# Patient Record
Sex: FEMALE | Race: WHITE | NOT HISPANIC OR LATINO | Employment: UNEMPLOYED | URBAN - METROPOLITAN AREA
[De-identification: names, ages, dates, MRNs, and addresses within clinical notes are randomized per-mention and may not be internally consistent; named-entity substitution may affect disease eponyms.]

---

## 2021-07-14 ENCOUNTER — HISTORICAL (OUTPATIENT)
Dept: ADMINISTRATIVE | Facility: HOSPITAL | Age: 56
End: 2021-07-14

## 2021-09-27 PROBLEM — M22.41 CHONDROMALACIA, PATELLA, RIGHT: Status: ACTIVE | Noted: 2021-09-27

## 2022-04-10 ENCOUNTER — HISTORICAL (OUTPATIENT)
Dept: ADMINISTRATIVE | Facility: HOSPITAL | Age: 57
End: 2022-04-10

## 2022-04-14 PROBLEM — M17.0 PRIMARY OSTEOARTHRITIS OF BOTH KNEES: Status: ACTIVE | Noted: 2022-04-14

## 2022-04-26 VITALS
BODY MASS INDEX: 41.85 KG/M2 | HEIGHT: 64 IN | WEIGHT: 245.13 LBS | SYSTOLIC BLOOD PRESSURE: 135 MMHG | DIASTOLIC BLOOD PRESSURE: 89 MMHG

## 2022-05-02 NOTE — HISTORICAL [facility]
This is a historical note converted from Ilia. Formatting and pictures may have been removed.  Please reference Ilia for original formatting and attached multimedia. Chief Complaint  referral right knee pain  History of Present Illness  56 Years??Female?non-smoker?p[facility]?for?initial visit?for?right?knee pain.? Patient points to knee diffusely.  ?   Patient reports 4 weeks ago while?cleaning van experienced sudden onset?pain that brought her to knees. Lasted a while and was unable to bear weight. Went to ED in [place] was given meds (Chart check?Ultram 50 mg, Robaxin 750 mg and nabumetone 750 mg), brace and crutches. Describes her pain?as constant 9/10?throbbing;? She is taking Tramadol 40mg 2-3x/day and muscle relaxer TID. Associated symptoms + locking, + numbness and tingling, +erythematous. Denies falls, recent trauma. Ambulates with crutches to keep her balance and has been wearing a knee brace. Unable to do house chores and mow her lawn. PCP told her she has a cyst behind leg and referred her here.  ?  Onset: ?4 weeks ago  Current pain level: 9/10 (rated as?severe) ?without medication?. Quality described as throbbing  Modifying Factors: ?worse with/after activity; improved with rest;??no stiffness??  Previous treatment: none  Medications related to CC: Tramadol 40mg 2-3x/day, Tylenol PM twice for sleep, Muscle relaxer  Previous injuries:?remote history of trauma  Associated Symptoms:?crepitus/grinding; ?no numbness or tingling;??no swelling;?no skin changes;?no weakness;?no decrease in ROM  Activity:?sedentary; full ADLs;?pain interferes with function/daily activity (mild)  Family History:?family history of arthritis  PCP: Dr. Isma Arenas  Employment: Disabled due to education limitations  ?  Review of Systems  Constitutional: no fever, no chills, no weight loss  CV: no swelling, no edema  GI: no urinary retention, no urinary incontinence  : no fecal incontinence  Skin: no rash,  no wound  Neuro:?+ numbness/tingling, no weakness, no saddle anesthesia  MSK: as above  Psych: no depression, no anxiety  Heme/Lymph: no easy bruising, no easy bleeding, no lymphadenopathy  Immuno: no allergic reaction, no recurrent infections  Physical Exam  Vitals & Measurements  HR:?89(Peripheral)? RR:?16? BP:?135/89?  HT:?163.00?cm? WT:?111.200?kg? BMI:?41.85?  General: well developed; well nourished; cooperative  PSYCH: alert and oriented with?appropriate mood and affect  SKIN: inspection and palpation of skin and soft tissue normal; no scars noted on lower extremities  CV: vascular integrity noted; +2 symmetrical pulses, no anish  NEURO: sensation intact by light touch  LYMPH: no LAD noted  ?   MSK:?right knee  Inspection:?limping;??non weight?bearing, ambulating?with crutches;??normal?alignment;??no swelling;?no?erythema;?no?bruising;?atrophy/deconditioning of quadriceps  Palpation:?tenderness over the quadriceps muscle and tendon, patella, and at the medial and lateral joint line; ?Crepitus:??Negative  ROM:?  Active Extension/Flexion (0-140):?Unable to flex 2/2 pain  Passive?Extension/Flexion (0-140):?0  Strength:? Unable to assess 2/2 pain  Special Tests:  Ballotable Effusion: ?Negative; Fluid Wave:??Negative  Anterior Drawer:?not tested;? Lachman:?Negative;? Pivot Shift:?not tested  Posterior Sag Sign:??not tested;? Posterior Drawer:?not tested; Dial Test:??not tested  Varus Stress:?LCL stable at 0 and 30 degrees  Valgus Stress:?MCL?stable at 0 and 30 degrees  Patellar Grind: ?not tested  Yadira:?not tested?; Apleys Compression:?not tested; Thessaly: ?not tested  Noble Compression:?not tested; Daisy:?not tested  Neurovascular:?Intact; 2+?distal pulse, sensation intact to light touch  ?  MSK:?left knee  Inspection:?limping;??Non-?weight bearing?ambulating with?crutches?normal?alignment;??no swelling;?no?erythema;?no?bruising;?no atrophy or deconditioning noted  Palpation:??non-tender;  ?Crepitus:??Negative  ROM:?  Active Extension/Flexion (0-140):?0-140  Passive?Extension/Flexion (0-140):?0-130  Strength:? Flexion??5/5, Extension??5/5  Special Tests:  Ballotable Effusion: ?Negative; Fluid Wave:??Negative  Anterior Drawer:?Negative;? Lachman:?Negative;? Pivot Shift:?Negative  Posterior Sag Sign:??not tested;? Posterior Drawer:?Negative; Dial Test:??not tested  Varus Stress:?LCL stable at 0 and 30 degrees  Valgus Stress:?MCL?stable at 0 and 30 degrees  Patellar Grind: ?not tested  Yadira:?Negative?; Apleys Compression:?Negative; Thessaly: ?Negative  Noble Compression:?not tested; Daisy:?not tested  Neurovascular:?Intact; 2+?distal pulse, sensation intact to light touch  ?  ?  Assessment/Plan  1.?Osteoarthritis of right knee?M17.11  DX: ?right?Knee osteoarthritis, Medial Meniscus tear  Discussed with patient diagnosis and treatment recommendations.? Handout given.  Imaging:?radiological studies ordered and independently reviewed; discussed with patient; pending radiologist interpretation  Treatment plan:?conservative treatment to include Naproxen 500 BID, Rx for hinged knee brace given use for 1-2weeks. Formal PT ordered 3x/week for 12weeks. ?Recommend stationary cycling and HEP?to strengthen muscles and prevent deconditioning; hot or cold therapy; adequate vitamin C/D intake  Weight Management is paramount:?recommend at least 10 pounds weight loss  Procedure:?discussed CSI vs VS injections as treatment options;?if conservative measures did not improve  Activity:?Activity as tolerated; HEP to included aerobic conditioning with non-painful activity and ROM/STG exercises; proper footwear; assistive device to avoid limping  Therapy:?formal PT ordered  Medication:?first line treatment with daily Naproxen 500mg BID, Rx for Right Hinged Knee Brace given  RTC:?4-6 weeks, if conservative measures of NSAIDs and PT dont improve symptoms, consider CSI  Additional work-up:?none  2.?Other meniscus derangements,  anterior horn of medial meniscus, right knee?M23.311  3.?Acute lateral meniscus tear of right knee?S83.281A  4.?Patellofemoral disorders, right knee?M22.2X1  Orders:  naproxen, 500 mg = 1 tab(s), Oral, BID, X 30 day(s), # 60 tab(s), 1 Refill(s)  Discussed with the resident at time of visit? Patient chart reviewed and above documentation (HPI, ROS, PE,?& Plan)?created by?Fellow?with my edits following a case discussion. Patient seen and evaluated at time of visit.?HPI, PE, and Assessment and Plan reviewed. Treatment plan is reasonable and appropriate.? All questions were answered.??Compliance with treatment plan is appropriate.  ?Radiology images independently reviewed and agree with radiologist. ?Radiology images independently reviewed and agree with resident.?-Soumya Quesada, DO CAQSM   Medications  Antivert 50 mg oral tablet, 1 tab(s), Oral, BID, PRN,? ?Not taking  lisinopril 40 mg oral tablet, 40 mg= 1 tab(s), Oral, Daily  nabumetone 750 mg oral tablet, 750 mg= 1 tab(s), Oral, BID,? ?Not taking  naproxen 500 mg oral delayed release tablet, 500 mg= 1 tab(s), Oral, BID, 1 refills  Pantoprazole 40 mg ORAL EC-Tablet, 40 mg= 1 tab(s), Oral, Daily  proparacaine 0.5% ophthalmic solution, 1 drop(s), Eye-Right, Once  traMADol 50 mg oral tablet, 50 mg= 1 tab(s), Oral, qPM  Diagnostic Results  Right Knee XR 7/14/2021: Reviewed by me pending radiologist interpretation:  ?   Medial narrowing of the joint space. No fractures or dislocations. No Lateral femoral notching  ?   (06/15/2021 14:16 CDT MRI Ext Lower Joint Right W/O Contrast)  IMPRESSION:?  1. Chondromalacia patella.  2. Moderate size joint effusion. Mild synovitis.?  3. Large medial popliteal fossa cyst.  4. Mild medial compartment, moderate lateral compartment, and  patellofemoral degenerative changes.  5. Medial meniscus intrasubstance degeneration.  6. Lateral meniscus intrasubstance degeneration with a lateral  meniscus anterior horn superior and inferior articular  surface tear.  ? [1]     [1]?MRI Ext Lower Joint Right W/O Contrast; Reema WEI, Leticia ARAGON 06/15/2021 14:16 CDT

## 2023-02-27 ENCOUNTER — HOSPITAL ENCOUNTER (EMERGENCY)
Facility: HOSPITAL | Age: 58
Discharge: HOME | End: 2023-02-28
Attending: STUDENT IN AN ORGANIZED HEALTH CARE EDUCATION/TRAINING PROGRAM
Payer: MEDICAID

## 2023-02-27 DIAGNOSIS — M79.673: ICD-10-CM

## 2023-02-27 DIAGNOSIS — M79.672 LEFT FOOT PAIN: Primary | ICD-10-CM

## 2023-02-27 LAB
ALBUMIN SERPL-MCNC: 3.6 G/DL (ref 3.5–5)
ALBUMIN/GLOB SERPL: 1.1 RATIO (ref 1.1–2)
ALP SERPL-CCNC: 86 UNIT/L (ref 40–150)
ALT SERPL-CCNC: 12 UNIT/L (ref 0–55)
AST SERPL-CCNC: 16 UNIT/L (ref 5–34)
BASOPHILS # BLD AUTO: 0.03 X10(3)/MCL (ref 0–0.2)
BASOPHILS NFR BLD AUTO: 0.3 %
BILIRUBIN DIRECT+TOT PNL SERPL-MCNC: 0.3 MG/DL
BUN SERPL-MCNC: 11.9 MG/DL (ref 9.8–20.1)
CALCIUM SERPL-MCNC: 8.9 MG/DL (ref 8.4–10.2)
CHLORIDE SERPL-SCNC: 102 MMOL/L (ref 98–107)
CO2 SERPL-SCNC: 27 MMOL/L (ref 22–29)
CREAT SERPL-MCNC: 0.84 MG/DL (ref 0.55–1.02)
EOSINOPHIL # BLD AUTO: 0.19 X10(3)/MCL (ref 0–0.9)
EOSINOPHIL NFR BLD AUTO: 1.9 %
ERYTHROCYTE [DISTWIDTH] IN BLOOD BY AUTOMATED COUNT: 13.6 % (ref 11.5–17)
GFR SERPLBLD CREATININE-BSD FMLA CKD-EPI: >60 MLS/MIN/1.73/M2
GLOBULIN SER-MCNC: 3.2 GM/DL (ref 2.4–3.5)
GLUCOSE SERPL-MCNC: 110 MG/DL (ref 74–100)
HCT VFR BLD AUTO: 36.9 % (ref 37–47)
HGB BLD-MCNC: 12 G/DL (ref 12–16)
IMM GRANULOCYTES # BLD AUTO: 0.03 X10(3)/MCL (ref 0–0.04)
IMM GRANULOCYTES NFR BLD AUTO: 0.3 %
LYMPHOCYTES # BLD AUTO: 2.65 X10(3)/MCL (ref 0.6–4.6)
LYMPHOCYTES NFR BLD AUTO: 26.3 %
MCH RBC QN AUTO: 29.2 PG
MCHC RBC AUTO-ENTMCNC: 32.5 G/DL (ref 33–36)
MCV RBC AUTO: 89.8 FL (ref 80–94)
MONOCYTES # BLD AUTO: 0.61 X10(3)/MCL (ref 0.1–1.3)
MONOCYTES NFR BLD AUTO: 6.1 %
NEUTROPHILS # BLD AUTO: 6.57 X10(3)/MCL (ref 2.1–9.2)
NEUTROPHILS NFR BLD AUTO: 65.1 %
NRBC BLD AUTO-RTO: 0 %
PLATELET # BLD AUTO: 256 X10(3)/MCL (ref 130–400)
PMV BLD AUTO: 9.1 FL (ref 7.4–10.4)
POTASSIUM SERPL-SCNC: 4.2 MMOL/L (ref 3.5–5.1)
PROT SERPL-MCNC: 6.8 GM/DL (ref 6.4–8.3)
RBC # BLD AUTO: 4.11 X10(6)/MCL (ref 4.2–5.4)
SODIUM SERPL-SCNC: 139 MMOL/L (ref 136–145)
WBC # SPEC AUTO: 10.1 X10(3)/MCL (ref 4.5–11.5)

## 2023-02-27 PROCEDURE — 85025 COMPLETE CBC W/AUTO DIFF WBC: CPT | Performed by: NURSE PRACTITIONER

## 2023-02-27 PROCEDURE — 99284 EMERGENCY DEPT VISIT MOD MDM: CPT | Mod: 25

## 2023-02-27 PROCEDURE — 80053 COMPREHEN METABOLIC PANEL: CPT | Performed by: NURSE PRACTITIONER

## 2023-02-27 RX ORDER — CLONIDINE HYDROCHLORIDE 0.1 MG/1
0.1 TABLET ORAL
Status: DISCONTINUED | OUTPATIENT
Start: 2023-02-27 | End: 2023-02-28 | Stop reason: HOSPADM

## 2023-02-28 VITALS
TEMPERATURE: 98 F | WEIGHT: 260 LBS | HEIGHT: 64 IN | BODY MASS INDEX: 44.39 KG/M2 | OXYGEN SATURATION: 97 % | HEART RATE: 75 BPM | DIASTOLIC BLOOD PRESSURE: 92 MMHG | SYSTOLIC BLOOD PRESSURE: 157 MMHG | RESPIRATION RATE: 20 BRPM

## 2023-02-28 PROCEDURE — 96372 THER/PROPH/DIAG INJ SC/IM: CPT | Performed by: STUDENT IN AN ORGANIZED HEALTH CARE EDUCATION/TRAINING PROGRAM

## 2023-02-28 PROCEDURE — 63600175 PHARM REV CODE 636 W HCPCS: Performed by: STUDENT IN AN ORGANIZED HEALTH CARE EDUCATION/TRAINING PROGRAM

## 2023-02-28 RX ORDER — KETOROLAC TROMETHAMINE 30 MG/ML
15 INJECTION, SOLUTION INTRAMUSCULAR; INTRAVENOUS
Status: DISCONTINUED | OUTPATIENT
Start: 2023-02-28 | End: 2023-02-28

## 2023-02-28 RX ORDER — KETOROLAC TROMETHAMINE 30 MG/ML
15 INJECTION, SOLUTION INTRAMUSCULAR; INTRAVENOUS
Status: COMPLETED | OUTPATIENT
Start: 2023-02-28 | End: 2023-02-28

## 2023-02-28 RX ORDER — KETOROLAC TROMETHAMINE 10 MG/1
10 TABLET, FILM COATED ORAL EVERY 6 HOURS
Qty: 16 TABLET | Refills: 0 | Status: SHIPPED | OUTPATIENT
Start: 2023-02-28 | End: 2023-03-05

## 2023-02-28 RX ADMIN — KETOROLAC TROMETHAMINE 15 MG: 30 INJECTION, SOLUTION INTRAMUSCULAR; INTRAVENOUS at 02:02

## 2023-02-28 NOTE — ED PROVIDER NOTES
Encounter Date: 2/27/2023    SCRIBE #1 NOTE: I, Cece West, am scribing for, and in the presence of,  Brian Childress MD.. I have scribed the following portions of the note - Other sections scribed: HPI, ROS, PE.     History     Chief Complaint   Patient presents with    Heel Pain     Started 2 days ago w/ l heel pain - denies known injury, reports pain worsening w/ ambulation shooting pain up leg     57 year old female with hx of DM and high cholesterol presents to ED c/o left heel pain onset 2 weeks ago. Pt states the pain has been gradually worsening since onset and is exacerbated by bearing weight. She also reports the pain radiates to her leg intermittently.She denies any trauma to foot. Pt takes tylenol for pain.     The history is provided by the patient. No  was used.   Foot Injury   There was no injury mechanism. The incident occurred several weeks ago. The pain is present in the left heel. The pain has been Worsening since onset. Associated symptoms include inability to bear weight and tingling (toes of left foot). Pertinent negatives include no numbness. The symptoms are aggravated by bearing weight and palpation. She has tried acetaminophen for the symptoms.   Review of patient's allergies indicates:   Allergen Reactions    Sulfa (sulfonamide antibiotics) Nausea Only, Nausea And Vomiting and Other (See Comments)     Past Medical History:   Diagnosis Date    Diabetes mellitus, type 2     High cholesterol      No past surgical history on file.  No family history on file.  Social History     Tobacco Use    Smoking status: Never    Smokeless tobacco: Never   Substance Use Topics    Drug use: Never     Review of Systems   Constitutional:  Negative for chills, fatigue and fever.   Respiratory:  Negative for shortness of breath.    Cardiovascular:  Negative for chest pain and leg swelling.   Gastrointestinal:  Negative for abdominal pain, constipation, diarrhea, nausea and vomiting.    Genitourinary:  Negative for difficulty urinating.   Musculoskeletal:  Positive for myalgias (left heel pain). Negative for arthralgias.   Neurological:  Positive for tingling (toes of left foot). Negative for weakness and numbness.     Physical Exam     Initial Vitals [02/27/23 2126]   BP Pulse Resp Temp SpO2   (!) 180/93 75 20 98 °F (36.7 °C) 97 %      MAP       --         Physical Exam    Nursing note and vitals reviewed.  Constitutional: She appears well-developed and well-nourished. She is not diaphoretic. No distress.   HENT:   Head: Normocephalic and atraumatic.   Right Ear: External ear normal.   Left Ear: External ear normal.   Nose: Nose normal.   Eyes: Conjunctivae and EOM are normal. Pupils are equal, round, and reactive to light. Right eye exhibits no discharge. Left eye exhibits no discharge.   Cardiovascular:  Normal rate, regular rhythm, normal heart sounds and intact distal pulses.     Exam reveals no gallop and no friction rub.       No murmur heard.  Pulmonary/Chest: Breath sounds normal. No respiratory distress. She has no wheezes. She has no rhonchi. She has no rales. She exhibits no tenderness.   Abdominal: Abdomen is soft. Bowel sounds are normal. She exhibits no distension and no mass. There is no abdominal tenderness. There is no rebound and no guarding.   Musculoskeletal:         General: No edema. Normal range of motion.      Left foot: Tenderness (to palpation of left heel) present.      Comments: Achilles tendon palpable. Tenderness to palpation along achilles tendon.      Neurological: She is alert and oriented to person, place, and time. No cranial nerve deficit or sensory deficit.   Skin: Skin is warm and dry. Capillary refill takes less than 2 seconds. No erythema. No pallor.   No erythema or broken skin to left heel.        ED Course   Procedures  Labs Reviewed   COMPREHENSIVE METABOLIC PANEL - Abnormal; Notable for the following components:       Result Value    Glucose Level 110  (*)     All other components within normal limits   CBC WITH DIFFERENTIAL - Abnormal; Notable for the following components:    RBC 4.11 (*)     Hct 36.9 (*)     MCHC 32.5 (*)     All other components within normal limits   CBC W/ AUTO DIFFERENTIAL    Narrative:     The following orders were created for panel order CBC auto differential.  Procedure                               Abnormality         Status                     ---------                               -----------         ------                     CBC with Differential[[id number]]        Abnormal            Final result                 Please view results for these tests on the individual orders.          Imaging Results              X-Ray Foot Complete Left (In process)                      Medications   ketorolac injection 15 mg (15 mg Intramuscular Given 2/28/23 0230)              Scribe Attestation:   Scribe #1: I performed the above scribed service and the documentation accurately describes the services I performed. I attest to the accuracy of the note.    Attending Attestation:           Physician Attestation for Scribe:  Physician Attestation Statement for Scribe #1: I, Brian Childress MD., reviewed documentation, as scribed by Cece West in my presence, and it is both accurate and complete.               Medical Decision Making  Patient presents with atraumatic left heel pain this been ongoing for a month slowly worsening.      Differential Diagnosis including, but not limited to trauma, fracture, tendinitis, patellar fasciitis, overuse injury, contusion, laceration, abrasion, foreign body    Patient is awake alert well-appearing.  Her physical exam is benign other than some tenderness palpation to both her Achilles tendon and left heel.  There is no broken skin, her plain films are unremarkable.  Her Achilles tendon appears to be intact on palpation.  The pain is worse with walking she may well have some plantar fasciitis.  She will be given  some Toradol from home being as she has not been trying any ibuprofen.  She is given care instructions for plantar fasciitis.  Encouraged to follow up with the primary care physician for further evaluation she may require some physical therapy.  Patient and family room are amenable to plan. Return precautions given.  Questions invited, questions answered to the best my ability.  Patient discharged home condition stable.        Amount and/or Complexity of Data Reviewed  External Data Reviewed: notes.     Details: Chart review does reveal history of chondromalacia to the patella.  For which she is seen orthopedic surgeons.  Radiology: ordered and independent interpretation performed. Decision-making details documented in ED Course.    Risk  Prescription drug management.              Clinical Impression:   Final diagnoses:  [M79.673] Pains, foot  [M79.672] Left foot pain (Primary)        ED Disposition Condition    Discharge Stable          ED Prescriptions       Medication Sig Dispense Start Date End Date Auth. Provider    ketorolac (TORADOL) 10 mg tablet Take 1 tablet (10 mg total) by mouth every 6 (six) hours. for 5 days 16 tablet 2/28/2023 3/5/2023 Brian Childress MD          Follow-up Information       Follow up With Specialties Details Why Contact Info    Isma Arenas MD Family Medicine Call   [address] [place] [id number]  [phone]               Brian Childress MD  02/28/23 6642

## 2023-02-28 NOTE — DISCHARGE INSTRUCTIONS
Thanks for letting us take care of you today!  It is our goal to give you courteous care and to keep you comfortable and informed, if you have any questions before you leave I will be happy to try and answer them.    Here is some advice after your visit:    Your visit in the emergency department is NOT definitive care - please follow-up with your primary care doctor and/or specialist within 1-2 days. Please return to the emergency department if you develop worsening symptoms including: fever, chills, chest pain, shortness of breath, weakness, numbness, tingling, nausea, vomiting, inability to eat, drink, or take your medication. Please return if you have any worsening in your condition or if you have any other concerns.    If you had radiology exams like an XRAY or CT in the emergency Department the interpreation on them may be preliminary - there may be less time sensitive findings on the reports please obtain these reports within 24 hours from the hospital or by using your out on your mobile phone to access records.  Bring these to your primary care doctor and/or specialist for further review of incidental findings.    Please review any LAB WORK from your visit today with your primary care physician.    May also consider some rest, ice, continuing to use NSAIDs has chest acetaminophen and ibuprofen (when not taking your Toradol) heel/arch support may help as well.    You have been prescribed ketorolac/Toradol.  If you are taking his medication please do not take any additional NSAIDs including ibuprofen, naproxen, indomethacin.  Please stay hydrated when taking this medication.    Please follow up with the primary care physician for further evaluation and management.

## 2023-02-28 NOTE — FIRST PROVIDER EVALUATION
Medical screening examination initiated.  I have conducted a focused provider triage encounter, findings are as follows:    Brief history of present illness:  Patient presents to emergency room with left heel pain for few days.    There were no vitals filed for this visit.    Pertinent physical exam:  Awake, alert, oriented x3.  Trachea midline.  No respiratory distress.  Moving all extremities.  No abdominal distention.  Neuro intact.  Psych normal.    Brief workup plan:  X-ray    Preliminary workup initiated; this workup will be continued and followed by the physician or advanced practice provider that is assigned to the patient when roomed.

## 2023-04-20 ENCOUNTER — HOSPITAL ENCOUNTER (OUTPATIENT)
Dept: TELEMEDICINE | Facility: HOSPITAL | Age: 58
Discharge: HOME | End: 2023-04-20
Payer: MEDICAID

## 2023-04-20 PROCEDURE — 99204 PR OFFICE/OUTPT VISIT, NEW, LEVL IV, 45-59 MIN: ICD-10-PCS | Mod: 95,,, | Performed by: PSYCHIATRY & NEUROLOGY

## 2023-04-20 PROCEDURE — 99204 OFFICE O/P NEW MOD 45 MIN: CPT | Mod: 95,,, | Performed by: PSYCHIATRY & NEUROLOGY

## 2023-04-20 NOTE — SUBJECTIVE & OBJECTIVE
Woke up with symptoms?: yes    Recent bleeding noted: no  Does the patient take any Blood Thinners? no  Medications: Antiplatelets:  no      Past Medical History: hypertension, diabetes, hyperlipidemia and obesity, OA    Past Surgical History: no major surgeries within the last 2 weeks    Family History: no relevant history    Social History: no smoking, no drinking, no drugs    Allergies: Sulfa (Sulfonamide Antibiotics)     Review of Systems   Constitutional: Negative for chills, diaphoresis and fever.   HENT: Negative for hearing loss, tinnitus and trouble swallowing.    Eyes: Negative for visual disturbance.   Respiratory: Negative for shortness of breath.    Cardiovascular: Negative for chest pain and palpitations.   Gastrointestinal: Negative for blood in stool and vomiting.   Endocrine: Negative for cold intolerance.   Genitourinary: Negative for hematuria.   Musculoskeletal: Negative for gait problem and neck pain.   Skin: Negative for rash.   Allergic/Immunologic: Negative for immunocompromised state.   Neurological: Positive for facial asymmetry, weakness and numbness. Negative for dizziness, speech difficulty and headaches.   Hematological: Does not bruise/bleed easily.   Psychiatric/Behavioral: Negative for agitation, behavioral problems and confusion.     Objective:   Vitals: There were no vitals taken for this visit.     CT READ: Yes  No hemmorhage. No mass effect. No early infarct signs.     Physical Exam  Vitals and nursing note reviewed.   Constitutional:       General: She is not in acute distress.     Appearance: Normal appearance. She is obese.   HENT:      Head: Normocephalic and atraumatic.      Nose: Nose normal.   Eyes:      Extraocular Movements: Extraocular movements intact.   Cardiovascular:      Rate and Rhythm: Normal rate and regular rhythm.   Pulmonary:      Effort: No respiratory distress.   Abdominal:      General: There is no distension.   Genitourinary:     Comments:  NA  Musculoskeletal:      Cervical back: Normal range of motion.      Right lower leg: No edema.      Left lower leg: No edema.   Skin:     Findings: No erythema or rash.   Neurological:      Mental Status: She is alert and oriented to person, place, and time.      Cranial Nerves: Cranial nerve deficit present.      Sensory: Sensory deficit present.      Motor: No weakness.      Coordination: Coordination normal.   Psychiatric:         Mood and Affect: Mood normal.         Behavior: Behavior normal.

## 2023-04-20 NOTE — CONSULTS
[facility]  Vascular Neurology  Comprehensive Stroke Center  TeleVascular Neurology Acute Consultation Note      Consults    Consulting Provider: NARCISA HOFFMANN  Current Providers  No providers found    Patient Location: [facility] Emergency Department  Spoke hospital nurse at bedside with patient assisting consultant.     Patient information was obtained from patient, past medical records, and primary team.         Assessment/Plan:    57 y/o with HTN, HLD, DM, obesity, OA, presents with acute onset R sided heaviness along with R face-arm numbness/tingling noted upon awakening at 8 am.  NIHSS 2, CT head without acute abnormality.  Concern for small acute L subcortical infarct. Out of the window for TNK. Don't suspect LVO but awaiting CTA.  If no LVO noted, admit to spoke facility and get MRI brain, TTE-bubble.  Load with  mg and  mg. Atorvastatin.  Neurology consult.    Diagnoses: R sided numbness and weakness.  No new Assessment & Plan notes have been filed under this hospital service since the last note was generated.  Service: Vascular Neurology      STROKE DOCUMENTATION     Acute Stroke Times:   Acute Stroke Times   Last Known Normal Date: 04/19/23  Last Known Normal Time: 2100  Symptom Onset Date: 04/20/23  Symptom Onset Time: 0800  Stroke Team Called Date: 04/20/23  Stroke Team Called Time: 1343  Stroke Team Arrival Date: 04/20/23  Stroke Team Arrival Time: 1357  CT Interpretation Time: 1357  Thrombolytic Therapy Recommended: No  Thrombectomy Recommended: No    NIH Scale:  Interval: baseline  1a. Level of Consciousness: 0-->Alert, keenly responsive  1b. LOC Questions: 0-->Answers both questions correctly  1c. LOC Commands: 0-->Performs both tasks correctly  2. Best Gaze: 0-->Normal  3. Visual: 0-->No visual loss  4. Facial Palsy: 1-->Minor paralysis (flattened nasolabial fold, asymmetry on smiling)  5a.  Motor Arm, Left: 0-->No drift, limb holds 90 (or 45) degrees for full 10 secs  5b. Motor Arm, Right: 0-->No drift, limb holds 90 (or 45) degrees for full 10 secs  6a. Motor Leg, Left: 0-->No drift, leg holds 30 degree position for full 5 secs  6b. Motor Leg, Right: 0-->No drift, leg holds 30 degree position for full 5 secs  7. Limb Ataxia: 0-->Absent  8. Sensory: 1-->Mild-to-moderate sensory loss, patient feels pinprick is less sharp or is dull on the affected side, or there is a loss of superficial pain with pinprick, but patient is aware of being touched  9. Best Language: 0-->No aphasia, normal  10. Dysarthria: 0-->Normal  11. Extinction and Inattention (formerly Neglect): 0-->No abnormality  Total (NIH Stroke Scale): 2     Modified [place] Score: 0  [place] Coma Scale:15   ABCD2 Score:    [id number] Score:   HAS -BLED Score:   ICH Score:   Hunt & Rao Classification:       There were no vitals taken for this visit.  Eligible for thrombolytic therapy?: No  Thrombolytic therapy recomended: Thrombolytic therapy not recommended due to Outside of treatment window   Possible Interventional Revascularization Candidate? No; at this time symptoms not suggestive of large vessel occlusion    Disposition Recommendation: admit to inpatient    Subjective:     History of Present Illness: 57 y/o with HTN, HLD, DM, obesity, OA, presents with acute onset R sided heaviness along with R face-arm numbness/tingling noted upon awakening at 8 am. Never had similar symptoms before.    No notes on file      Woke up with symptoms?: yes    Recent bleeding noted: no  Does the patient take any Blood Thinners? no  Medications: Antiplatelets:  no      Past Medical History: hypertension, diabetes, hyperlipidemia and obesity, OA    Past Surgical History: no major surgeries within the last 2 weeks    Family History: no relevant history    Social History: no smoking, no drinking, no drugs    Allergies: Sulfa (Sulfonamide Antibiotics)     Review  of Systems   Constitutional: Negative for chills, diaphoresis and fever.   HENT: Negative for hearing loss, tinnitus and trouble swallowing.    Eyes: Negative for visual disturbance.   Respiratory: Negative for shortness of breath.    Cardiovascular: Negative for chest pain and palpitations.   Gastrointestinal: Negative for blood in stool and vomiting.   Endocrine: Negative for cold intolerance.   Genitourinary: Negative for hematuria.   Musculoskeletal: Negative for gait problem and neck pain.   Skin: Negative for rash.   Allergic/Immunologic: Negative for immunocompromised state.   Neurological: Positive for facial asymmetry, weakness and numbness. Negative for dizziness, speech difficulty and headaches.   Hematological: Does not bruise/bleed easily.   Psychiatric/Behavioral: Negative for agitation, behavioral problems and confusion.     Objective:   Vitals: There were no vitals taken for this visit.     CT READ: Yes  No hemmorhage. No mass effect. No early infarct signs.     Physical Exam  Vitals and nursing note reviewed.   Constitutional:       General: She is not in acute distress.     Appearance: Normal appearance. She is obese.   HENT:      Head: Normocephalic and atraumatic.      Nose: Nose normal.   Eyes:      Extraocular Movements: Extraocular movements intact.   Cardiovascular:      Rate and Rhythm: Normal rate and regular rhythm.   Pulmonary:      Effort: No respiratory distress.   Abdominal:      General: There is no distension.   Genitourinary:     Comments: NA  Musculoskeletal:      Cervical back: Normal range of motion.      Right lower leg: No edema.      Left lower leg: No edema.   Skin:     Findings: No erythema or rash.   Neurological:      Mental Status: She is alert and oriented to person, place, and time.      Cranial Nerves: Cranial nerve deficit present.      Sensory: Sensory deficit present.      Motor: No weakness.      Coordination: Coordination normal.   Psychiatric:         Mood and  Affect: Mood normal.         Behavior: Behavior normal.             Recommended the emergency room physician to have a brief discussion with the patient and/or family if available regarding the  risks and benefits of treatment, and to briefly document the occurrence of that discussion in his clinical encounter note.     The attending portion of this evaluation, treatment, and documentation was performed per Sawyer Hoffman MD via audiovisual.    Billing code:  (non-intervention mild to moderate stroke, TIA, some mimics)      This patient has a critical neurological condition/illness, with some potential for high morbidity and mortality.  There is a moderate probability for acute neurological change leading to clinical and possibly life-threatening deterioration requiring highest level of physician preparedness for urgent intervention.  Care was coordinated with other physicians involved in the patient's care.  Radiologic studies and laboratory data were reviewed and interpreted, and plan of care was re-assessed based on the results.  Diagnosis, treatment options and prognosis may have been discussed with the patient and/or family members or caregiver.    In your opinion, this was a: Tier 2 Van Negative    Consult End Time: 205 PM    Sawyer Hoffman MD  [facility]  Vascular Neurology   [facility]

## 2025-01-16 DIAGNOSIS — M25.562 KNEE PAIN, BILATERAL: Primary | ICD-10-CM

## 2025-01-16 DIAGNOSIS — M25.561 KNEE PAIN, BILATERAL: Primary | ICD-10-CM

## 2025-02-07 ENCOUNTER — OFFICE VISIT (OUTPATIENT)
Dept: ORTHOPEDICS | Facility: CLINIC | Age: 60
End: 2025-02-07
Payer: MEDICAID

## 2025-02-07 VITALS
BODY MASS INDEX: 44.38 KG/M2 | DIASTOLIC BLOOD PRESSURE: 63 MMHG | HEART RATE: 70 BPM | TEMPERATURE: 98 F | HEIGHT: 64 IN | WEIGHT: 259.94 LBS | SYSTOLIC BLOOD PRESSURE: 122 MMHG

## 2025-02-07 DIAGNOSIS — M17.0 PRIMARY OSTEOARTHRITIS OF BOTH KNEES: Primary | ICD-10-CM

## 2025-02-07 DIAGNOSIS — M25.561 KNEE PAIN, BILATERAL: ICD-10-CM

## 2025-02-07 DIAGNOSIS — M25.562 KNEE PAIN, BILATERAL: ICD-10-CM

## 2025-02-07 PROCEDURE — 99214 OFFICE O/P EST MOD 30 MIN: CPT | Mod: PBBFAC

## 2025-02-07 RX ORDER — ESTRADIOL 1 MG/1
1 TABLET ORAL
COMMUNITY

## 2025-02-07 RX ORDER — ESCITALOPRAM OXALATE 20 MG/1
TABLET ORAL EVERY MORNING
COMMUNITY

## 2025-02-07 RX ORDER — CARVEDILOL 12.5 MG/1
12.5 TABLET ORAL 2 TIMES DAILY
COMMUNITY
Start: 2024-12-17

## 2025-02-07 RX ORDER — GABAPENTIN 600 MG/1
TABLET ORAL
COMMUNITY

## 2025-02-07 RX ORDER — MELOXICAM 15 MG/1
15 TABLET ORAL DAILY
Qty: 30 TABLET | Refills: 0 | Status: SHIPPED | OUTPATIENT
Start: 2025-02-07 | End: 2025-03-09

## 2025-02-07 NOTE — PROGRESS NOTES
"  Subjective:    Patient ID: Winifred Shabazz is a 59 y.o. female  who presented to [facility] for new visit..      Chief Complaint:  Pain of left and right knee      History of Present Illness:    Winifred Shabazz who has a history of b/l knee osteoarthritis presented today with with knee pain involving both knees for the past 6 years. Pain is located generalized around both knees. Quality of pain is described as Burning with radiation down the legs. Pain rated 10/10 at this moment, 7/10 on best day  Inciting event: none known. Pain is aggravated by any weight bearing, inactivity, rising after sitting, standing, and walking.  Patient has had prior knee problems. Evaluation to date: plain films, PCP evaluation, and Ortho evaluation. Treatment to date: avoidance of activity, bracing, oral analgesics, CSI, and ice/heat. Last CSI years ago. Expectations for today's visit includes pain relieve.  Occupation includes unemployed on SSI. PCP is Isma Arenas MD. Patient has been told in the past will need total knee replacement surgery and is pending back surgery after getting knee pain resolved.    Knee Review of Systems:  Swelling?  no  Instability?  yes, reports knee giving out  Mechanical sx?  yes, feels snap, crackles, and popping to knee along with bone rubbing sensation. Reports locking sensation  <30 min AM stiffness? yes  Limited ROM? yes  Fever/Chills? no    Current Choice of Exercise:  none       Objective:      Physical Exam:    /63 (BP Location: Left arm, Patient Position: Sitting)   Pulse 70   Temp 97.6 °F (36.4 °C) (Oral)   Ht 5' 4" (1.626 m)   Wt 117.9 kg (259 lb 14.8 oz)   BMI 44.62 kg/m²       Appearance:  antalgic  WBAT  Alignment: Left: normal Right: normal   Soft tissue swelling: Left: no Right: no  Effusion: Left:  Negative Right: Negative  Erythema: Left no Right: no  Ecchymosis: Left: no Right: no  Atrophy: Left: no Right: " no    Palpation:  Knee Tenderness: Left: nonspecific generalized tenderness Right: nonspecific generalized tenderness    Range of motion:  Flexion (140): Left:  140 Right: 140  Extension (0): Left: 0 Right: 0    Strength:  Extension: Left 5/5  Pain: yes     Right 5/5 Pain: yes  Flexion: Left 5/5 Pain: yes Right   5/5 Pain: yes        Special Tests:  Ballotable Effusion:Left: Negative Right: Negative   Fluid Wave: Left: Negative Right: Negative   Crepitus: Left: Positive Right: Positive   Patellar grind test:  Left: Positive  Right: Positive  Apprehension test: Left: Positive Right: Positive   Varus: @ 0, Left Negative Right: Negative.  @ 30, Left Negative  Right Negative   Valgus: @ 0, Left Negative Right: Negative.  @ 30, Left Negative  Right Negative  Lachman: Left: Negative Right: Negative   Ant Drawer: Left: Negative Right: Negative   Posterior Drawer: Left: Negative Right: Negative   Dial Test: Left: Not performed Right: Not performed   Yadira: Left: Not performed Right: Not performed   Apley's: Left: Not performed Right: Not performed  Thessaly's: Left: Not performed Right: Not performed   Noble Compression: Left: Not performed Right: Not performed   Daisy: Left: Not performed Right: Not performed       General appearance: NAD  Peripheral pulses: normal bilaterally     Labs:  Last A1c: The patient doesn't have any registry metric data available     Imaging:   Previous images reviewed.  X-rays ordered and performed today: no  # of views:  N/A  Laterality: n/a  My Interpretation:  not available     Assessment:          1. Primary osteoarthritis of both knees    2. Knee pain, bilateral          Plan:         Orders Placed This Encounter    meloxicam (MOBIC) 15 MG tablet     Treatment Plan: Discussed with patient diagnosis and treatment recommendations. Education provided. Treatment: avoidance of aggravating activity significant modification of daily activities hot/cold therapies topical and oral medications  braces HEP/PT/OT injections. Patient interested in total knee replacement surgery, discussed with patient that due to current BMI of 44.6 patient will need to lose approximately 40 lb of weight until she may be considered for surgical intervention at a BMI of 38.  Imaging: No new imaging obtained  Procedure: Discussed CSI/VSI as treatment options, patient not interested  Activity: Activity as tolerated; HEP to include aerobic conditioning and strength training with non-painful activity. ROM/STG exercises. Proper footware; assistive devises to avoid limping.   Therapy: Physical Therapy along with aquatic therapy  Medication: Please see your primary care physician for further refills. Medications precaution given.  START over-the-counter acetaminophen (Tylenol 1000 mg three times per day as needed)  START meloxicam (Mobic 15 mg daily)  CONTINUE over-the-counter acetaminophen (Tylenol 1000 mg three times per day as needed)  RTC: PRN; call if any issues.

## 2025-02-10 NOTE — PROGRESS NOTES
Faculty Attestation: Winifred Garnettaux  was seen in Sports Medicine Clinic Discussed with resident at the time of the visit. History of Present Illness, Physical Exam, and Assessment and Plan reviewed.     Treatment plan is reasonable and appropriate. Compliance with treatment recommendations is important.      No imaging studies were done today.     No procedure was performed.    Madhav Torrez MD  Sports Medicine